# Patient Record
Sex: MALE | Race: ASIAN | Employment: FULL TIME | URBAN - METROPOLITAN AREA
[De-identification: names, ages, dates, MRNs, and addresses within clinical notes are randomized per-mention and may not be internally consistent; named-entity substitution may affect disease eponyms.]

---

## 2017-07-18 ENCOUNTER — HOSPITAL ENCOUNTER (OUTPATIENT)
Dept: PHYSICAL THERAPY | Age: 37
Discharge: HOME | End: 2017-07-18
Payer: COMMERCIAL

## 2017-07-18 PROCEDURE — 97161 PT EVAL LOW COMPLEX 20 MIN: CPT

## 2017-07-18 PROCEDURE — 97110 THERAPEUTIC EXERCISES: CPT

## 2017-07-18 NOTE — PROGRESS NOTES
[facility] PHYSICAL THERAPY  [address] [place] [address] 201,[facility], [facility] - Phone: [phone]  Fax: [phone] [facility] / [address] [place] [address] PHYSICAL THERAPY SERVICES  Patient Name: Sade Lea : 1980   Medical   Diagnosis: R adductor strain Treatment Diagnosis: Right thigh pain [M79.651]   Onset Date: Early 2017     Referral Source: Boubacar Berger MD Start of Care [facility]): 2017   Prior Hospitalization: See medical history Provider #: [id number]   Prior Level of Function: Able to workout at gym, ambulate pain-free w/o difficulty   Comorbidities: None reported   Medications: Verified on Patient Summary List   The Plan of Care and following information is based on the information from the initial evaluation.   ===========================================================================================  Assessment / key information:  Pt is a 38 y/o M who presents to PT w/ c/o R groin and posterolateral hip pain ranging 6-8/10 that began after riding stationary bike at high resistance x 1 hour approx 1 month ago. Pt reports some discomfort immediately after however notes approx 2 days later he had painful weightbearing. Reports he attempted to rest the LE however approx 2 weeks ago walked on the beach and sx worsened. Pt reports he was prescribed medrol dose pack (no significant improvement thus far per pt report) and was given B axillary crutch and taken out of work x 1 week. Pain is made worse standing, walking, and ascending stairs and made better with sitting/laying down and icy-hot. Pt denies bruising or discoloration to skin over injured areas. Pt presents ambulating w/ B axillary crutch with step-to pattern, dec WB through R LE and dec hip ext R. Ambulation w/o AD shows significant inc in antalgic gait, dec step length, and (+) R trendelenburg. Pt unable to SLS R due to pain.  R hip AROM flex 107 deg, abd 22 deg p!, ER 44, IR 37 (L hip AROM flex 110, abd 23, ER 45, IR 38). MMT R hip flex 3+/5 p!, abd 3+/5 p!, ext 3+/5 p!, add 4-/5 p! (L hip MMT flex 5/5, abd 5/5, add 4/5, ext 4+/5), R knee flex 4+/5, ext 4+/5 (L knee 5/5). TTP to R adductor longus, brandy, glute med, and distal glute max. (-)Hyder Pat, (-) Faddir, (-) 90/90. Unable to assess skin integrity on evaluation due to pt in long pants; advised to wear shorts to first f/u appointment. FOTO score 17/100. Pt educated on dx, prognosis, POC, HEP, and gait training. Pt may benefit from PT to address problem list below to enable pt improved functional mobility.  ===========================================================================================  Eval Complexity: History LOW Complexity : Zero comorbidities / personal factors that will impact the outcome / POC;  Examination  MEDIUM Complexity : 3 Standardized tests and measures addressing body structure, function, activity limitation and / or participation in recreation ; Presentation MEDIUM Complexity : Evolving with changing characteristics ;   Decision Making HIGH Complexity : FOTO score of 1- 25 ; Overall Complexity LOW   Problem List: pain affecting function, decrease ROM, decrease strength, impaired gait/ balance, decrease ADL/ functional abilitiies, decrease activity tolerance, decrease flexibility/ joint mobility and decrease transfer abilities   Treatment Plan may include any combination of the following: Therapeutic exercise, Therapeutic activities, Neuromuscular re-education, Physical agent/modality, Gait/balance training, Manual therapy, Patient education, Self Care training, Functional mobility training, Home safety training and Stair training  Patient / Family readiness to learn indicated by: asking questions, trying to perform skills and interest  Persons(s) to be included in education: patient (P)  Barriers to Learning/Limitations: yes; scheduling conflict/high copay  Measures taken: Pt may be able to attend PT only 1x/week due to work schedule and high copay; will plan to progress HEP as tolerated to progress through program   Patient Goal (s): \"feel better\"   Patient self reported health status: poor  Rehabilitation Potential: good   Short Term Goals: To be accomplished in  2  weeks:  1. Pt will be I and compliant with HEP for self management of sx  2. Pt will ambulate >500' w/o AD demonstrating symmetrical stride to allow pt return to work  [facility] Long Term Goals: To be accomplished in  4-6  weeks:  1. Improve R hip MMT to at least 4/5 grossly to improve ability to stand, ambulate, transfer  2. Improve FOTO score to >/= 54/100 to indicate improved function  3. Pt will denote at least +4 GROC score and be prepared for DC to HEP  Frequency / Duration:   Patient to be seen  1-2  times per week for 4-6  weeks:  Patient / Caregiver education and instruction: activity modification and exercises  G-Codes (GP): na  Therapist Signature: Isaac George PT Date: 6/95/9525   Certification Period: na Time: 8:14 AM   ===========================================================================================  I certify that the above Physical Therapy Services are being furnished while the patient is under my care. I agree with the treatment plan and certify that this therapy is necessary. Physician Signature:        Date:       Time:     Please sign and return to [facility] at [facility]. or you may fax the signed copy to [phone]. Thank you.

## 2017-07-18 NOTE — PROGRESS NOTES
PHYSICAL THERAPY - DAILY TREATMENT NOTE    Patient Name: Michelle Raw        Date: 2017  : 1980   yes Patient  Verified  Visit #:      8  Insurance: Payor: Jacob Cartagena / Plan: [facility] PPO / Product Type: PPO /      In time: 7:35 Out time: 8:10   Total Treatment Time: 35     Medicare Time Tracking (below)   Total Timed Codes (min):  na 1:1 Treatment Time:  na     TREATMENT AREA =  Right thigh pain [M79.651]    SUBJECTIVE  Pain Level (on 0 to 10 scale):  8  / 10   Medication Changes/New allergies or changes in medical history, any new surgeries or procedures?    no  If yes, update Summary List   Subjective Functional Status/Changes:  []  No changes reported     See POC          OBJECTIVE  8 min Therapeutic Exercise:  [x]  See flow sheet   Rationale:      increase ROM and increase strength to improve the patients ability to ambulate      min Patient Education:  yes  Reviewed HEP   []  Progressed/Changed HEP based on: Other Objective/Functional Measures:    Pt demo I w/ HEP  Reviewed gait mechanics w/ B axillary crutch     Post Treatment Pain Level (on 0 to 10) scale:    10     ASSESSMENT  Assessment/Changes in Function:     See POC     []  See Progress Note/Recertification   Patient will continue to benefit from skilled PT services to modify and progress therapeutic interventions, address functional mobility deficits, address ROM deficits, address strength deficits, analyze and address soft tissue restrictions, analyze and cue movement patterns, analyze and modify body mechanics/ergonomics, assess and modify postural abnormalities, address imbalance/dizziness and instruct in home and community integration to attain remaining goals.    Progress toward goals / Updated goals:    See POC     PLAN  []  Upgrade activities as tolerated yes Continue plan of care   []  Discharge due to :    []  Other:      Therapist: Anita Estrada, PT    Date: 2017 Time: 8:11 AM Future Appointments  Date Time Provider Sunday Cardenas   7/20/2017 10:30 AM Tracie All [facility]   7/25/2017 3:30 PM Cris Reddy, PTA [facility]   8/1/2017 4:00 PM Cris Reddy, PTA [facility]   8/8/2017 4:00 PM Cris Reddy, PTA [facility]   8/14/2017 4:00 PM Bong Hilton, PT [facility]

## 2017-07-20 ENCOUNTER — HOSPITAL ENCOUNTER (OUTPATIENT)
Dept: PHYSICAL THERAPY | Age: 37
Discharge: HOME | End: 2017-07-20
Payer: COMMERCIAL

## 2017-07-20 PROCEDURE — 97140 MANUAL THERAPY 1/> REGIONS: CPT

## 2017-07-20 PROCEDURE — 97110 THERAPEUTIC EXERCISES: CPT

## 2017-07-20 NOTE — PROGRESS NOTES
PHYSICAL THERAPY - DAILY TREATMENT NOTE    Patient Name: Ray Jaimes        Date: 2017  : 1980   YES Patient  Verified  Visit #:   2   of   8  Insurance: Payor: BLUE CROSS / Plan: [facility] PPO / Product Type: PPO /      In time: 1030 Out time: 1145   Total Treatment Time: 75     Medicare Time Tracking (below)   Total Timed Codes (min):   1:1 Treatment Time:       TREATMENT AREA =  Right thigh pain [M79.651]    SUBJECTIVE  Pain Level (on 0 to 10 scale):  5-6  / 10   Medication Changes/New allergies or changes in medical history, any new surgeries or procedures? NO    If yes, update Summary List   Subjective Functional Status/Changes:  []  No changes reported     Performed HEP 2x/day. No questions about them.             OBJECTIVE  Modalities Rationale:     decrease inflammation and decrease pain to improve patient's ability to perform functional mobility activities with decrease c/o symptoms   min [] Estim, type/location:                                      []  att     []  unatt     []  w/US     []  w/ice    []  w/heat    min []  Mechanical Traction: type/lbs                   []  pro   []  sup   []  int   []  cont    []  before manual    []  after manual    min []  Ultrasound, settings/location:      min []  Iontophoresis w/ dexamethasone, location:                                               []  take home patch       []  in clinic   10 min [x]  Ice     []  Heat    location/position: Inside R thigh.    min []  Vasopneumatic Device, press/temp:     min []  Other:    [] Skin assessment post-treatment (if applicable):    []  intact    []  redness- no adverse reaction     []redness  adverse reaction:        40 min Therapeutic Exercise:  [x]  See flow sheet   Rationale:      increase ROM and increase strength to improve the patients ability to perform functional mobility activities with decrease c/o symptoms     25 min Manual Therapy: DTM/TPR add longus/brandy, glute med/max, add stretch   Rationale:      decrease pain, increase ROM and increase tissue extensibility to improve patient's ability to perform functional mobility activities with decrease c/o symptoms       min Patient Education:  YES  Reviewed HEP   []  Progressed/Changed HEP based on: Other Objective/Functional Measures:    No change in functional measurements today. Improved gait sequence with crutches at end of session. Post Treatment Pain Level (on 0 to 10) scale:   4-5  / 10     ASSESSMENT  Assessment/Changes in Function:     Overall good tolerance to all therapeutic interventions for first treatment session. []  See Progress Note/Recertification   Patient will continue to benefit from skilled PT services to modify and progress therapeutic interventions, address functional mobility deficits, address ROM deficits, address strength deficits, analyze and address soft tissue restrictions and analyze and cue movement patterns to attain remaining goals. Progress toward goals / Updated goals:    No change toward goals today.      PLAN  []  Upgrade activities as tolerated YES Continue plan of care   []  Discharge due to :    []  Other:      Therapist: ISAURA Lujan    Date: 7/20/2017 Time: 6:24 AM       Future Appointments  Date Time Provider Sunday Cardenas   7/20/2017 10:30 AM Joyce Syed PTA [facility]   7/25/2017 3:30 PM Nicol Urbina PTA [facility]   8/1/2017 4:00 PM Nicol Urbina PTA [facility]   8/8/2017 4:00 PM Nicol Urbina PTA [facility]   8/14/2017 4:00 PM Reece Hardy, PT [facility]

## 2017-07-25 ENCOUNTER — HOSPITAL ENCOUNTER (OUTPATIENT)
Dept: PHYSICAL THERAPY | Age: 37
Discharge: HOME | End: 2017-07-25
Payer: COMMERCIAL

## 2017-07-25 PROCEDURE — 97140 MANUAL THERAPY 1/> REGIONS: CPT

## 2017-07-25 PROCEDURE — 97110 THERAPEUTIC EXERCISES: CPT

## 2017-07-25 NOTE — PROGRESS NOTES
PHYSICAL THERAPY - DAILY TREATMENT NOTE    Patient Name: Baron Cosme        Date: 2017  : 1980   YES Patient  Verified  Visit #:   3   of   8  Insurance: Payor: Jey Arizmendi / Plan: [facility] PPO / Product Type: PPO /      In time: 340 Out time: 440   Total Treatment Time: 60     Medicare Time Tracking (below)   Total Timed Codes (min):  50 1:1 Treatment Time:       TREATMENT AREA =  Right thigh pain [M79.651]    SUBJECTIVE  Pain Level (on 0 to 10 scale):  2  / 10   Medication Changes/New allergies or changes in medical history, any new surgeries or procedures? NO    If yes, update Summary List   Subjective Functional Status/Changes:  []  No changes reported     \"I hurt it again when I spent a long day on the beach during July 4th weekend. \"        OBJECTIVE  Modalities Rationale:     decrease inflammation and decrease pain to improve patient's ability to perform pain free ADLs. min [] Estim, type/location:                                      []  att     []  unatt     []  w/US     []  w/ice    []  w/heat    min []  Mechanical Traction: type/lbs                   []  pro   []  sup   []  int   []  cont    []  before manual    []  after manual    min []  Ultrasound, settings/location:      min []  Iontophoresis w/ dexamethasone, location:                                               []  take home patch       []  in clinic   10 min [x]  Ice     []  Heat    location/position: Supine, (R) adductor. min []  Vasopneumatic Device, press/temp:     min []  Other:    [x] Skin assessment post-treatment (if applicable):    [x]  intact    []  redness- no adverse reaction     []redness  adverse reaction:        30 min Therapeutic Exercise:  [x]  See flow sheet   Rationale:      increase ROM, increase strength, improve coordination and improve balance to improve the patients ability to ambulate.       20 Min Manual Therapy: STM/DTM and TPR (R) adductor longus/brandy, glute med/piriformis. Rationale:      decrease pain, increase ROM, increase tissue extensibility and decrease trigger points to improve patient's ability to perform pain free ADLs. min Patient Education:  YES  Reviewed HEP   []  Progressed/Changed HEP based on: Other Objective/Functional Measures: Therex per flow sheet. Post Treatment Pain Level (on 0 to 10) scale:   4  / 10     ASSESSMENT  Assessment/Changes in Function:     TTP along hip adductor. No tenderness @ glute med/piriformis. []  See Progress Note/Recertification   Patient will continue to benefit from skilled PT services to modify and progress therapeutic interventions, address functional mobility deficits, address ROM deficits, address strength deficits, analyze and address soft tissue restrictions, analyze and cue movement patterns, analyze and modify body mechanics/ergonomics and assess and modify postural abnormalities to attain remaining goals. Progress toward goals / Updated goals:    No change in progress toward LTG's with today's session.       PLAN  [x]  Upgrade activities as tolerated YES Continue plan of care   []  Discharge due to :    []  Other:      Therapist: Gege Zambrano PTA    Date: 7/25/2017 Time: 4:18 PM     Future Appointments  Date Time Provider Sunday Cardenas   8/1/2017 4:00 PM Amber Phoenix [facility]   8/8/2017 4:00 PM Gege Zambrano PTA [facility]   8/14/2017 4:00 PM Janine Pemberton PT [facility]

## 2017-08-01 ENCOUNTER — HOSPITAL ENCOUNTER (OUTPATIENT)
Dept: PHYSICAL THERAPY | Age: 37
Discharge: HOME | End: 2017-08-01
Payer: COMMERCIAL

## 2017-08-01 PROCEDURE — 97140 MANUAL THERAPY 1/> REGIONS: CPT

## 2017-08-01 PROCEDURE — 97110 THERAPEUTIC EXERCISES: CPT

## 2017-08-01 NOTE — PROGRESS NOTES
PHYSICAL THERAPY - DAILY TREATMENT NOTE    Patient Name: Ruy Franks        Date: 2017  : 1980   YES Patient  Verified  Visit #:   4   of   8  Insurance: Payor: Ace Everette / Plan: [facility] PPO / Product Type: PPO /      In time: 355 Out time: 450   Total Treatment Time: 55     Medicare Time Tracking (below)   Total Timed Codes (min):  45 1:1 Treatment Time:       TREATMENT AREA =  Right thigh pain [M79.651]    SUBJECTIVE  Pain Level (on 0 to 10 scale):  -7  / 10   Medication Changes/New allergies or changes in medical history, any new surgeries or procedures? NO    If yes, update Summary List   Subjective Functional Status/Changes:  []  No changes reported     \"I stopped using the crutches today. \"  Pain reported on the quad and to the outside of the (R) hip. OBJECTIVE  Modalities Rationale:     decrease inflammation and decrease pain to improve patient's ability to ambulate. min [] Estim, type/location:                                      []  att     []  unatt     []  w/US     []  w/ice    []  w/heat    min []  Mechanical Traction: type/lbs                   []  pro   []  sup   []  int   []  cont    []  before manual    []  after manual    min []  Ultrasound, settings/location:      min []  Iontophoresis w/ dexamethasone, location:                                               []  take home patch       []  in clinic   10 min [x]  Ice     []  Heat    location/position: Supine, (R) adductor. min []  Vasopneumatic Device, press/temp:     min []  Other:    [x] Skin assessment post-treatment (if applicable):    [x]  intact    []  redness- no adverse reaction     []redness  adverse reaction:        30 min Therapeutic Exercise:  [x]  See flow sheet   Rationale:      increase ROM, increase strength and improve coordination to improve the patients ability to ambulate. 15 min Manual Therapy: STM add longus, brandy, glute med and RF.     Rationale:      decrease pain, increase ROM, increase tissue extensibility and decrease trigger points to improve patient's ability to perform pain free ADLs. min Patient Education:  YES  Reviewed HEP   []  Progressed/Changed HEP based on: Other Objective/Functional Measures: Therex per flow sheet. Post Treatment Pain Level (on 0 to 10) scale:   0  / 10     ASSESSMENT  Assessment/Changes in Function:     No exacerbation of symptoms with today's session. TTP along (R) quad, tenderness at adductors despite pt not having c/o pain in that area. []  See Progress Note/Recertification   Patient will continue to benefit from skilled PT services to modify and progress therapeutic interventions, address functional mobility deficits, address ROM deficits, address strength deficits, analyze and address soft tissue restrictions, analyze and cue movement patterns, analyze and modify body mechanics/ergonomics and assess and modify postural abnormalities to attain remaining goals. Progress toward goals / Updated goals:    No change in progress toward LTG's with today's session.       PLAN  [x]  Upgrade activities as tolerated YES Continue plan of care   []  Discharge due to :    []  Other:      Therapist: Iliana Frazier PTA    Date: 8/1/2017 Time: 4:21 PM     Future Appointments  Date Time Provider Sunday Cardenas   8/8/2017 4:00 PM Beverly Javier [facility]   8/14/2017 4:00 PM Anita Estrada PT [facility]

## 2017-08-08 ENCOUNTER — HOSPITAL ENCOUNTER (OUTPATIENT)
Dept: PHYSICAL THERAPY | Age: 37
Discharge: HOME | End: 2017-08-08
Payer: COMMERCIAL

## 2017-08-08 PROCEDURE — 97110 THERAPEUTIC EXERCISES: CPT

## 2017-08-08 PROCEDURE — 97140 MANUAL THERAPY 1/> REGIONS: CPT

## 2017-08-08 NOTE — PROGRESS NOTES
PHYSICAL THERAPY - DAILY TREATMENT NOTE    Patient Name: Michelle Raw        Date: 2017  : 1980   YES Patient  Verified  Visit #:   5   of   8  Insurance: Payor: Jacob Cartagena / Plan: [facility] PPO / Product Type: PPO /      In time: 355 Out time: 450   Total Treatment Time: 55     Medicare Time Tracking (below)   Total Timed Codes (min):  45 1:1 Treatment Time:       TREATMENT AREA =  Right thigh pain [M79.651]    SUBJECTIVE  Pain Level (on 0 to 10 scale):  3  / 10   Medication Changes/New allergies or changes in medical history, any new surgeries or procedures? NO    If yes, update Summary List   Subjective Functional Status/Changes:  []  No changes reported       Pt states things are getting a little better. OBJECTIVE  Modalities Rationale:     decrease inflammation and decrease pain to improve patient's ability to perform pain free ADLs. min [] Estim, type/location:                                      []  att     []  unatt     []  w/US     []  w/ice    []  w/heat    min []  Mechanical Traction: type/lbs                   []  pro   []  sup   []  int   []  cont    []  before manual    []  after manual    min []  Ultrasound, settings/location:      min []  Iontophoresis w/ dexamethasone, location:                                               []  take home patch       []  in clinic   10 min [x]  Ice     []  Heat    location/position: Supine, (R) adductor. min []  Vasopneumatic Device, press/temp:     min []  Other:    [x] Skin assessment post-treatment (if applicable):    [x]  intact    []  redness- no adverse reaction     []redness  adverse reaction:        30 min Therapeutic Exercise:  [x]  See flow sheet   Rationale:      increase ROM, increase strength, improve coordination and improve balance to improve the patients ability to ambulate. 15 Min Manual Therapy: STM/DTM (R) adductor, quad, glute med.     Rationale:      decrease pain, increase ROM, increase tissue extensibility and decrease trigger points to improve patient's ability to perform pain free ADLs. min Patient Education:  YES  Reviewed HEP   []  Progressed/Changed HEP based on: Other Objective/Functional Measures: Therex per flow sheet. Added prone quad stretch to improve (R) LE flexibility for ADLs. Updated HEP exercises to progress strengthening at home. Post Treatment Pain Level (on 0 to 10) scale:   0  / 10     ASSESSMENT  Assessment/Changes in Function:     No exacerbation of symptoms with today's session. []  See Progress Note/Recertification   Patient will continue to benefit from skilled PT services to modify and progress therapeutic interventions, address functional mobility deficits, address ROM deficits, address strength deficits, analyze and address soft tissue restrictions, analyze and cue movement patterns, analyze and modify body mechanics/ergonomics and assess and modify postural abnormalities to attain remaining goals. Progress toward goals / Updated goals:    No change in progress toward LTG's with today's session.       PLAN  [x]  Upgrade activities as tolerated YES Continue plan of care   []  Discharge due to :    []  Other:      Therapist: Estephania Lopez PTA    Date: 8/8/2017 Time: 4:21 PM     Future Appointments  Date Time Provider Sunday Cardenas   8/14/2017 4:00 PM Alex Sepulveda PT [facility]

## 2017-08-14 ENCOUNTER — HOSPITAL ENCOUNTER (OUTPATIENT)
Dept: PHYSICAL THERAPY | Age: 37
Discharge: HOME | End: 2017-08-14
Payer: COMMERCIAL

## 2017-08-14 PROCEDURE — 97140 MANUAL THERAPY 1/> REGIONS: CPT

## 2017-08-14 PROCEDURE — 97110 THERAPEUTIC EXERCISES: CPT

## 2017-08-14 NOTE — PROGRESS NOTES
PHYSICAL THERAPY - DAILY TREATMENT NOTE    Patient Name: Jl Bunn        Date: 2017  : 1980   yes Patient  Verified  Visit #:   6   of   8  Insurance: Payor: Vanesa Justin / Plan: [employer] [facility] [id number] PPO / Product Type: PPO /      In time: 4:00 Out time: 4:40   Total Treatment Time: 40     Medicare Time Tracking (below)   Total Timed Codes (min):  na 1:1 Treatment Time:  na     TREATMENT AREA =  Right thigh pain [M79.651]    SUBJECTIVE  Pain Level (on 0 to 10 scale):  3  / 10   Medication Changes/New allergies or changes in medical history, any new surgeries or procedures?    no  If yes, update Summary List   Subjective Functional Status/Changes:  []  No changes reported     Pt reports 90% overall improvement in sx, 3/10 avg pain, 4/ 10 max pain. Reports inc ease w/ ambulation and stair climbing. Notes he returned to work today w/ soreness mid day but did exercises and soreness subsided. OBJECTIVE    30 min Therapeutic Exercise:  [x]  See flow sheet   Rationale:      increase ROM and increase strength to improve the patients ability to ambulate     10 min Manual Therapy: STM to R adductor longus, RF, TFL   Rationale:      decrease pain, increase ROM, increase tissue extensibility and decrease trigger points to improve patient's ability to ambulate     min Patient Education:  yes  Reviewed HEP   []  Progressed/Changed HEP based on: Other Objective/Functional Measures:    MMT R hop flex 4/5 p!, abd4+/5, ext 5/5, add 4/5 p! FOTO 66/100  GROC +5     Post Treatment Pain Level (on 0 to 10) scale:   0  / 10     ASSESSMENT  Assessment/Changes in Function:     See DC summary     []  See Progress Note/Recertification   Patient will continue to benefit from skilled PT services to n/a to attain remaining goals.    Progress toward goals / Updated goals:    See DC summary     PLAN  []  Upgrade activities as tolerated no Continue plan of care   [x]  Discharge due to : Goals met, program complete   []  Other:      Therapist: Isaac George PT    Date: 8/14/2017 Time: 2:15 PM     Future Appointments  Date Time Provider Sunday Cardenas   8/14/2017 4:00 PM Isaac George PT [address]

## 2017-08-15 NOTE — PROGRESS NOTES
[address] [facility] PHYSICAL THERAPY   [facility], [address], [place], [address] [facility] - Phone: [phone]  Fax: [phone]  DISCHARGE SUMMARY  Patient Name: Vee Castro : 1980   Treatment/Medical Diagnosis: Right thigh pain [M79.651]   Referral Source: Sal Mon MD     Date of Initial Visit: 17 Attended Visits: 6 Missed Visits: 0     SUMMARY OF TREATMENT  PT tx has consisted of therex to improve R LE flexibility, strength, core stability, and gait; manual tx including STM and passive stretch; and pt education including HEP instruction. CURRENT STATUS  Pt attended 6 sessions of PT for the tx of R adductor strain. Pt reports 90% overall improvement in sx, 3/10 avg pain, 4/10 max pain. Pt reports inc ease w/ ambulation and stair climbing, and reported he has returned to full work duty. Pt notes intermittent groin or medial thigh discomfort that subsides w/ HEP. R hip MMT flex 4/5 slight p!, abd 4+/5, ext 5/5, add 4/5 slight p!. Pt ambulating w/o AD w/ symmetrical gait. FOTO score 66/100, GROC +5 \"a good deal better\". Pt demo I w/ HEP and has requested DC at this time. Thank you. Goal/Measure of Progress Goal Met? 1. Improve R hip MMT to at least 4/5 grossly to improve ability to stand, ambulate, transfer   Status at last Eval: Flex 3+/5 p!, abd 3+/5 p!, ext 3+/5 p!, add 4-/5 p! Current Status: See above yes   2. Improve FOTO score to >/= 54/100 to indicate improved function   Status at last Eval: 17 Current Status: 66/100 yes   3. Pt will denote at least +4 GROC score and be prepared ro DC to HEP   Status at last Eval: n/a Current Status: +5 yes     RECOMMENDATIONS  Discontinue therapy. Progressing towards or have reached established goals. If you have any questions/comments please contact us directly at [phone]. Thank you for allowing us to assist in the care of your patient.     Therapist Signature: [id number] [facility], PT Date: 8/15/17     Time: 7:56 AM